# Patient Record
Sex: MALE | Race: WHITE
[De-identification: names, ages, dates, MRNs, and addresses within clinical notes are randomized per-mention and may not be internally consistent; named-entity substitution may affect disease eponyms.]

---

## 2022-01-11 ENCOUNTER — HOSPITAL ENCOUNTER (EMERGENCY)
Dept: HOSPITAL 46 - ED | Age: 52
Discharge: HOME | End: 2022-01-11
Payer: COMMERCIAL

## 2022-01-11 VITALS — WEIGHT: 225 LBS | HEIGHT: 72 IN | BODY MASS INDEX: 30.48 KG/M2
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
## 2022-01-11 DIAGNOSIS — S93.402A: Primary | ICD-10-CM

## 2022-01-11 DIAGNOSIS — F17.200: ICD-10-CM

## 2022-01-11 DIAGNOSIS — I10: ICD-10-CM

## 2022-01-11 DIAGNOSIS — Z79.899: ICD-10-CM

## 2024-03-27 NOTE — XMS
PreManage Notification: KYRIE KRAFT MRN:[id number]
 
Security Information
 
Security Events
No recent Security Events currently on file
 
 
 
CRITERIA MET
------------
- [facility] - 2 Visits in 30 Days
 
 
CARE PROVIDERS
-------------------------------------------------------------------------------------
BLAIR BRUNO      Physician Assistant     Current
 
PHONE: [phone]
-------------------------------------------------------------------------------------
 
[facility] has no Care Guidelines for this patient.
 
SUSAN VISIT COUNT (12 MO.)
-------------------------------------------------------------------------------------
3 [facility]
-------------------------------------------------------------------------------------
TOTAL 3
-------------------------------------------------------------------------------------
NOTE: Visits indicate total known visits.
 
ED/UCC VISIT TRACKING (12 MO.)
-------------------------------------------------------------------------------------
01/11/2022 17:44
[facility]
 
TYPE: Emergency
 
COMPLAINT:
- LT ANKLE INJURY
-------------------------------------------------------------------------------------
12/19/2021 14:35
[facility]
 
TYPE: Emergency
 
COMPLAINT:
- SWELLING ON HEAD
 
DIAGNOSES:
- Localized edema
- Other long term (current) drug therapy
- Essential (primary) hypertension
- Nicotine dependence, unspecified, uncomplicated
-------------------------------------------------------------------------------------
11/14/2021 11:06
[facility]
 
TYPE: Emergency
 
 
COMPLAINT:
- VOMITING, COUGHING, SORE THROAT
 
DIAGNOSES:
- Other long term (current) drug therapy
- Essential (primary) hypertension
- Nicotine dependence, unspecified, uncomplicated
- Vomiting, unspecified
- Viral infection, unspecified
-------------------------------------------------------------------------------------
 
 
INPATIENT VISIT TRACKING (12 MO.)
No inpatient visits to display in this time frame
 
[email or web address] Pt verbalized understanding.  No further questions or concerns.